# Patient Record
Sex: FEMALE | Race: BLACK OR AFRICAN AMERICAN | NOT HISPANIC OR LATINO | Employment: FULL TIME | URBAN - METROPOLITAN AREA
[De-identification: names, ages, dates, MRNs, and addresses within clinical notes are randomized per-mention and may not be internally consistent; named-entity substitution may affect disease eponyms.]

---
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
## 2019-02-13 ENCOUNTER — HISTORICAL (OUTPATIENT)
Dept: LAB | Facility: HOSPITAL | Age: 19
End: 2019-02-13

## 2019-02-13 LAB — T PALLIDUM AB SER QL: NORMAL

## 2020-02-13 LAB — POC BETA-HCG (QUAL): NEGATIVE

## 2020-02-17 LAB — POC BETA-HCG (QUAL): NEGATIVE

## 2022-04-10 ENCOUNTER — HISTORICAL (OUTPATIENT)
Dept: ADMINISTRATIVE | Facility: HOSPITAL | Age: 22
End: 2022-04-10

## 2022-04-11 ENCOUNTER — HISTORICAL (OUTPATIENT)
Dept: ADMINISTRATIVE | Facility: HOSPITAL | Age: 22
End: 2022-04-11

## 2022-04-26 VITALS
DIASTOLIC BLOOD PRESSURE: 70 MMHG | HEIGHT: 67 IN | BODY MASS INDEX: 26.64 KG/M2 | SYSTOLIC BLOOD PRESSURE: 120 MMHG | WEIGHT: 169.75 LBS

## 2022-09-19 ENCOUNTER — HISTORICAL (OUTPATIENT)
Dept: ADMINISTRATIVE | Facility: HOSPITAL | Age: 22
End: 2022-09-19

## 2023-04-10 ENCOUNTER — HOSPITAL ENCOUNTER (OUTPATIENT)
Dept: RADIOLOGY | Facility: HOSPITAL | Age: 23
Discharge: HOME | End: 2023-04-10
Attending: NURSE PRACTITIONER
Payer: MEDICAID

## 2023-04-10 DIAGNOSIS — R59.9 ENLARGED LYMPH NODES: Primary | ICD-10-CM

## 2023-04-10 DIAGNOSIS — R59.9 ENLARGED LYMPH NODES: ICD-10-CM

## 2023-04-10 PROCEDURE — 76536 US EXAM OF HEAD AND NECK: CPT | Mod: TC

## 2023-04-25 ENCOUNTER — ANESTHESIA EVENT (OUTPATIENT)
Dept: SURGERY | Facility: HOSPITAL | Age: 23
End: 2023-04-25
Payer: MEDICAID

## 2023-04-25 ENCOUNTER — HOSPITAL ENCOUNTER (OUTPATIENT)
Dept: PREADMISSION TESTING | Facility: HOSPITAL | Age: 23
Discharge: HOME | End: 2023-04-25
Payer: MEDICAID

## 2023-04-25 ENCOUNTER — HOSPITAL ENCOUNTER (OUTPATIENT)
Dept: RADIOLOGY | Facility: HOSPITAL | Age: 23
Discharge: HOME | End: 2023-04-25
Attending: SURGERY
Payer: MEDICAID

## 2023-04-25 VITALS — HEIGHT: 68 IN | BODY MASS INDEX: 28.46 KG/M2 | WEIGHT: 187.81 LBS

## 2023-04-25 DIAGNOSIS — Z01.818 PRE-OP TESTING: Primary | ICD-10-CM

## 2023-04-25 LAB
ABS NEUT CALC (OHS): 1.29 X10(3)/MCL (ref 2.1–9.2)
ALBUMIN SERPL-MCNC: 4.6 G/DL (ref 3.4–5)
ALBUMIN/GLOB SERPL: 1.6 RATIO
ALP SERPL-CCNC: 63 UNIT/L (ref 50–144)
ALT SERPL-CCNC: 29 UNIT/L (ref 1–45)
ANION GAP SERPL CALC-SCNC: 5 MEQ/L (ref 2–13)
ANISOCYTOSIS BLD QL SMEAR: SLIGHT
AST SERPL-CCNC: 31 UNIT/L (ref 14–36)
B-HCG SERPL QL: NEGATIVE
BILIRUBIN DIRECT+TOT PNL SERPL-MCNC: 0.4 MG/DL (ref 0–1)
BUN SERPL-MCNC: 10 MG/DL (ref 7–20)
CALCIUM SERPL-MCNC: 9.7 MG/DL (ref 8.4–10.2)
CHLORIDE SERPL-SCNC: 105 MMOL/L (ref 98–110)
CO2 SERPL-SCNC: 29 MMOL/L (ref 21–32)
CREAT SERPL-MCNC: 0.56 MG/DL (ref 0.66–1.25)
CREAT/UREA NIT SERPL: 18 (ref 12–20)
EOSINOPHIL NFR BLD MANUAL: 0.03 X10(3)/MCL (ref 0–0.9)
EOSINOPHIL NFR BLD MANUAL: 1 % (ref 0–8)
ERYTHROCYTE [DISTWIDTH] IN BLOOD BY AUTOMATED COUNT: 11.3 % (ref 11–14.5)
GFR SERPLBLD CREATININE-BSD FMLA CKD-EPI: >90 MLS/MIN/1.73/M2
GLOBULIN SER-MCNC: 2.9 GM/DL (ref 2–3.9)
GLUCOSE SERPL-MCNC: 80 MG/DL (ref 70–115)
HCT VFR BLD AUTO: 39 % (ref 36–48)
HGB BLD-MCNC: 13.1 G/DL (ref 11.8–16)
IMM GRANULOCYTES # BLD AUTO: 0 X10(3)/MCL (ref 0–0.03)
IMM GRANULOCYTES NFR BLD AUTO: 0 % (ref 0–0.5)
INR BLD: 0.97
LYMPHOCYTES NFR BLD MANUAL: 1.72 X10(3)/MCL
LYMPHOCYTES NFR BLD MANUAL: 52 % (ref 13–40)
MCH RBC QN AUTO: 31.6 PG (ref 27–34)
MCV RBC AUTO: 94 FL (ref 79–99)
MEAN CELL HEMOGLOBIN CONCENTRATION (OHS) G/DL: 33.6 G/DL (ref 31–37)
MICROCYTES BLD QL SMEAR: SLIGHT
MONOCYTES NFR BLD MANUAL: 0.26 X10(3)/MCL (ref 0.1–1.3)
MONOCYTES NFR BLD MANUAL: 8 % (ref 2–11)
NEUTROPHILS NFR BLD MANUAL: 37 % (ref 47–80)
NEUTS BAND NFR BLD MANUAL: 2 % (ref 0–11)
NRBC BLD AUTO-RTO: 0 % (ref 0–1)
PLATELET # BLD AUTO: 202 X10(3)/MCL (ref 140–371)
PLATELET # BLD EST: ADEQUATE 10*3/UL
PMV BLD AUTO: 10.6 FL (ref 9.4–12.4)
POTASSIUM SERPL-SCNC: 4.2 MMOL/L (ref 3.5–5.1)
PROT SERPL-MCNC: 7.5 GM/DL (ref 6.3–8.2)
PROTHROMBIN TIME: 9.9 SECONDS (ref 9.3–11.9)
RBC # BLD AUTO: 4.15 X10(6)/MCL (ref 4–5.1)
SODIUM SERPL-SCNC: 139 MMOL/L (ref 135–145)
WBC # SPEC AUTO: 3.3 X10(3)/MCL (ref 4–11.5)

## 2023-04-25 PROCEDURE — 85025 COMPLETE CBC W/AUTO DIFF WBC: CPT | Performed by: SURGERY

## 2023-04-25 PROCEDURE — 80053 COMPREHEN METABOLIC PANEL: CPT | Performed by: SURGERY

## 2023-04-25 PROCEDURE — 93010 ELECTROCARDIOGRAM REPORT: CPT | Mod: ,,, | Performed by: INTERNAL MEDICINE

## 2023-04-25 PROCEDURE — 93005 ELECTROCARDIOGRAM TRACING: CPT

## 2023-04-25 PROCEDURE — 81025 URINE PREGNANCY TEST: CPT

## 2023-04-25 PROCEDURE — 85027 COMPLETE CBC AUTOMATED: CPT | Performed by: SURGERY

## 2023-04-25 PROCEDURE — 93010 EKG 12-LEAD: ICD-10-PCS | Mod: ,,, | Performed by: INTERNAL MEDICINE

## 2023-04-25 PROCEDURE — 85610 PROTHROMBIN TIME: CPT | Performed by: SURGERY

## 2023-04-25 PROCEDURE — 71046 X-RAY EXAM CHEST 2 VIEWS: CPT | Mod: TC

## 2023-04-25 RX ORDER — ACETAMINOPHEN AND CODEINE PHOSPHATE 120; 12 MG/5ML; MG/5ML
1 SOLUTION ORAL DAILY
COMMUNITY
End: 2023-05-25 | Stop reason: SDUPTHER

## 2023-04-25 RX ORDER — DEXTROSE MONOHYDRATE AND SODIUM CHLORIDE 5; .9 G/100ML; G/100ML
INJECTION, SOLUTION INTRAVENOUS CONTINUOUS
Status: CANCELLED | OUTPATIENT
Start: 2023-04-26

## 2023-04-25 RX ORDER — CEFAZOLIN SODIUM IN 0.9 % NACL 2 G/100 ML
2 PLASTIC BAG, INJECTION (ML) INTRAVENOUS
Status: CANCELLED | OUTPATIENT
Start: 2023-04-26

## 2023-04-25 NOTE — ANESTHESIA PREPROCEDURE EVALUATION
04/25/2023  Joanna Prater is a 22 y.o., female.      Pre-op Assessment    I have reviewed the Patient Summary Reports.     I have reviewed the Nursing Notes. I have reviewed the NPO Status.   I have reviewed the Medications.     Review of Systems  Anesthesia Hx:  No problems with previous Anesthesia  Denies Family Hx of Anesthesia complications.   Denies Personal Hx of Anesthesia complications.   Social:  Smoker    Hematology/Oncology:  Hematology Normal   Oncology Normal     EENT/Dental:EENT/Dental Normal   Cardiovascular:   Exercise tolerance: good Valvular problems/Murmurs    Pulmonary:   Asthma mild  Asthma:    Renal/:  Renal/ Normal     Hepatic/GI:  Hepatic/GI Normal    Musculoskeletal:  Spine Disorders: lumbar Chronic Pain    Neurological:  Neurology Normal    Endocrine:  Endocrine Normal    Dermatological:  Skin Normal    Psych:   depression          Physical Exam  General: Well nourished, Cooperative, Alert and Oriented    Airway:  Mallampati: II / II  Mouth Opening: Normal  TM Distance: Normal  Tongue: Normal  Neck ROM: Normal ROM    Dental:  Intact        Anesthesia Plan  Type of Anesthesia, risks & benefits discussed:    Anesthesia Type: MAC  Intra-op Monitoring Plan: Standard ASA Monitors  Post Op Pain Control Plan: multimodal analgesia  Induction:  IV  Airway Plan: Direct  Informed Consent: Informed consent signed with the Patient and all parties understand the risks and agree with anesthesia plan.  All questions answered. Patient consented to blood products? Yes  ASA Score: 2  Day of Surgery Review of History & Physical: H&P Update referred to the surgeon/provider.I have interviewed and examined the patient. I have reviewed the patient's H&P dated: There are no significant changes.     Ready For Surgery From Anesthesia Perspective.     .

## 2023-04-25 NOTE — DISCHARGE INSTRUCTIONS

## 2023-04-26 ENCOUNTER — HOSPITAL ENCOUNTER (OUTPATIENT)
Facility: HOSPITAL | Age: 23
Discharge: HOME | End: 2023-04-26
Attending: SURGERY | Admitting: SURGERY
Payer: MEDICAID

## 2023-04-26 ENCOUNTER — ANESTHESIA (OUTPATIENT)
Dept: SURGERY | Facility: HOSPITAL | Age: 23
End: 2023-04-26
Payer: MEDICAID

## 2023-04-26 VITALS
TEMPERATURE: 98 F | DIASTOLIC BLOOD PRESSURE: 56 MMHG | RESPIRATION RATE: 18 BRPM | HEART RATE: 49 BPM | SYSTOLIC BLOOD PRESSURE: 113 MMHG | OXYGEN SATURATION: 100 %

## 2023-04-26 DIAGNOSIS — Z01.818 PRE-OP TESTING: ICD-10-CM

## 2023-04-26 DIAGNOSIS — R59.9 ENLARGED LYMPH NODE: Primary | ICD-10-CM

## 2023-04-26 LAB — B-HCG SERPL QL: NEGATIVE

## 2023-04-26 PROCEDURE — 71000016 HC POSTOP RECOV ADDL HR: Performed by: SURGERY

## 2023-04-26 PROCEDURE — 63600175 PHARM REV CODE 636 W HCPCS: Performed by: NURSE ANESTHETIST, CERTIFIED REGISTERED

## 2023-04-26 PROCEDURE — S5010 5% DEXTROSE AND 0.45% SALINE: HCPCS | Performed by: SURGERY

## 2023-04-26 PROCEDURE — 63600175 PHARM REV CODE 636 W HCPCS: Performed by: SURGERY

## 2023-04-26 PROCEDURE — D9220A PRA ANESTHESIA: Mod: ,,, | Performed by: NURSE ANESTHETIST, CERTIFIED REGISTERED

## 2023-04-26 PROCEDURE — 25000003 PHARM REV CODE 250: Performed by: NURSE ANESTHETIST, CERTIFIED REGISTERED

## 2023-04-26 PROCEDURE — 71000015 HC POSTOP RECOV 1ST HR: Performed by: SURGERY

## 2023-04-26 PROCEDURE — 37000009 HC ANESTHESIA EA ADD 15 MINS: Performed by: SURGERY

## 2023-04-26 PROCEDURE — D9220A PRA ANESTHESIA: ICD-10-PCS | Mod: ,,, | Performed by: NURSE ANESTHETIST, CERTIFIED REGISTERED

## 2023-04-26 PROCEDURE — 36000706: Performed by: SURGERY

## 2023-04-26 PROCEDURE — 25000003 PHARM REV CODE 250: Performed by: SURGERY

## 2023-04-26 PROCEDURE — 81025 URINE PREGNANCY TEST: CPT | Performed by: SURGERY

## 2023-04-26 PROCEDURE — 71000033 HC RECOVERY, INTIAL HOUR: Performed by: SURGERY

## 2023-04-26 PROCEDURE — 36000707: Performed by: SURGERY

## 2023-04-26 PROCEDURE — 00320 ANES ALL PX NECK NOS 1YR/>: CPT | Performed by: SURGERY

## 2023-04-26 PROCEDURE — 37000008 HC ANESTHESIA 1ST 15 MINUTES: Performed by: SURGERY

## 2023-04-26 RX ORDER — SODIUM CHLORIDE 9 MG/ML
INJECTION, SOLUTION INTRAVENOUS CONTINUOUS
Status: DISCONTINUED | OUTPATIENT
Start: 2023-04-26 | End: 2023-04-26 | Stop reason: HOSPADM

## 2023-04-26 RX ORDER — PROPOFOL 10 MG/ML
INJECTION, EMULSION INTRAVENOUS
Status: DISCONTINUED | OUTPATIENT
Start: 2023-04-26 | End: 2023-04-26

## 2023-04-26 RX ORDER — CEFAZOLIN SODIUM IN 0.9 % NACL 2 G/100 ML
2 PLASTIC BAG, INJECTION (ML) INTRAVENOUS
Status: DISCONTINUED | OUTPATIENT
Start: 2023-04-26 | End: 2023-04-26

## 2023-04-26 RX ORDER — FENTANYL CITRATE 50 UG/ML
INJECTION, SOLUTION INTRAMUSCULAR; INTRAVENOUS
Status: DISCONTINUED | OUTPATIENT
Start: 2023-04-26 | End: 2023-04-26

## 2023-04-26 RX ORDER — LIDOCAINE HYDROCHLORIDE 10 MG/ML
INJECTION INFILTRATION; PERINEURAL
Status: DISCONTINUED | OUTPATIENT
Start: 2023-04-26 | End: 2023-04-26 | Stop reason: HOSPADM

## 2023-04-26 RX ORDER — LIDOCAINE HYDROCHLORIDE 20 MG/ML
INJECTION INTRAVENOUS
Status: DISCONTINUED | OUTPATIENT
Start: 2023-04-26 | End: 2023-04-26

## 2023-04-26 RX ORDER — MIDAZOLAM HYDROCHLORIDE 1 MG/ML
2 INJECTION INTRAMUSCULAR; INTRAVENOUS
Status: COMPLETED | OUTPATIENT
Start: 2023-04-26 | End: 2023-04-26

## 2023-04-26 RX ORDER — BUPIVACAINE HYDROCHLORIDE 5 MG/ML
INJECTION, SOLUTION EPIDURAL; INTRACAUDAL
Status: DISCONTINUED | OUTPATIENT
Start: 2023-04-26 | End: 2023-04-26 | Stop reason: HOSPADM

## 2023-04-26 RX ORDER — DEXTROSE MONOHYDRATE AND SODIUM CHLORIDE 5; .45 G/100ML; G/100ML
INJECTION, SOLUTION INTRAVENOUS CONTINUOUS
Status: DISCONTINUED | OUTPATIENT
Start: 2023-04-26 | End: 2023-04-26 | Stop reason: HOSPADM

## 2023-04-26 RX ORDER — ONDANSETRON 2 MG/ML
INJECTION INTRAMUSCULAR; INTRAVENOUS
Status: DISCONTINUED | OUTPATIENT
Start: 2023-04-26 | End: 2023-04-26

## 2023-04-26 RX ORDER — FAMOTIDINE 20 MG/1
20 TABLET, FILM COATED ORAL
Status: COMPLETED | OUTPATIENT
Start: 2023-04-26 | End: 2023-04-26

## 2023-04-26 RX ORDER — DEXTROSE MONOHYDRATE AND SODIUM CHLORIDE 5; .9 G/100ML; G/100ML
INJECTION, SOLUTION INTRAVENOUS CONTINUOUS
Status: DISCONTINUED | OUTPATIENT
Start: 2023-04-26 | End: 2023-04-26

## 2023-04-26 RX ADMIN — CEFAZOLIN 2 G: 2 INJECTION, POWDER, FOR SOLUTION INTRAMUSCULAR; INTRAVENOUS at 01:04

## 2023-04-26 RX ADMIN — FENTANYL CITRATE 50 MCG: 50 INJECTION, SOLUTION INTRAMUSCULAR; INTRAVENOUS at 02:04

## 2023-04-26 RX ADMIN — LIDOCAINE HYDROCHLORIDE 50 MG: 20 INJECTION, SOLUTION INTRAVENOUS at 01:04

## 2023-04-26 RX ADMIN — FENTANYL CITRATE 50 MCG: 50 INJECTION, SOLUTION INTRAMUSCULAR; INTRAVENOUS at 01:04

## 2023-04-26 RX ADMIN — MIDAZOLAM HYDROCHLORIDE 2 MG: 1 INJECTION, SOLUTION INTRAMUSCULAR; INTRAVENOUS at 01:04

## 2023-04-26 RX ADMIN — ONDANSETRON 4 MG: 2 INJECTION INTRAMUSCULAR; INTRAVENOUS at 01:04

## 2023-04-26 RX ADMIN — GLYCOPYRROLATE 0.2 MG: 0.2 INJECTION, SOLUTION INTRAMUSCULAR; INTRAVITREAL at 11:04

## 2023-04-26 RX ADMIN — PROPOFOL 140 MG: 10 INJECTION, EMULSION INTRAVENOUS at 01:04

## 2023-04-26 RX ADMIN — DEXTROSE AND SODIUM CHLORIDE: 5; 450 INJECTION, SOLUTION INTRAVENOUS at 11:04

## 2023-04-26 RX ADMIN — FAMOTIDINE 20 MG: 20 TABLET, FILM COATED ORAL at 11:04

## 2023-04-26 NOTE — DISCHARGE SUMMARY
[facility]  Discharge Note  Short Stay    Procedure(s) (LRB):  EXCISION, NODULE (Left)      OUTCOME: Patient tolerated treatment/procedure well without complication and is now ready for discharge.    DISPOSITION: Home or Self Care    FINAL DIAGNOSIS:  Lymph node neck status post excisional biopsy    FOLLOWUP: In clinic upon week    DISCHARGE INSTRUCTIONS:  No discharge procedures on file.     TIME SPENT ON DISCHARGE:  5 minutes

## 2023-04-26 NOTE — PLAN OF CARE
Received from PACU per stretcher in good condition, awake and alert.  Report from AD Ku RN. Iced water brought, does not want any right now. Dressing dry and intact.

## 2023-04-26 NOTE — ANESTHESIA POSTPROCEDURE EVALUATION
Anesthesia Post Evaluation    Patient: Joanna Prater    Procedure(s) Performed: Procedure(s) (LRB):  EXCISION, NODULE (Left)    Final Anesthesia Type: general      Patient location during evaluation: PACU  Patient participation: Yes- Able to Participate  Level of consciousness: awake and alert, awake and oriented  Post-procedure vital signs: reviewed and stable  Pain management: adequate  Airway patency: patent    PONV status at discharge: No PONV  Anesthetic complications: no      Cardiovascular status: blood pressure returned to baseline  Respiratory status: unassisted, room air and spontaneous ventilation  Hydration status: euvolemic  Follow-up not needed.          Vitals Value Taken Time   /59 04/26/23 1131   Temp 36.3 °C (97.4 °F) 04/26/23 1131   Pulse 58 04/26/23 1131   Resp 18 04/26/23 1131   SpO2 100 % 04/26/23 1131         No case tracking events are documented in the log.      Pain/Milan Score: No data recorded

## 2023-04-26 NOTE — OP NOTE
[facility]  Operative Note      Date of Procedure: 4/26/2023     Procedure: Procedure(s) (LRB):  EXCISION, NODULE (Left)     Surgeon(s) and Role:     * Fidencio Rosenberg MD - Primary    Assisting Surgeon: None    Pre-Operative Diagnosis:  Enlarged left upper posterior neck lymph node    Post-Operative Diagnosis: Post-Op Diagnosis Codes:     * Enlarged lymph node [R59.9]    Anesthesia: General    Operative Findings (including complications, if any):  Patient is a 22-year-old  female persistent lymph node in the left neck that was treated with antibiotics unresolved and needed excision.  Patient had an ultrasound on 04/10/2023 that was consistent with a 1.5 cm node on the left neck.    Patient was brought to the operating room supine position underwent local and IV sedation had an incision made over the lymph node it was on the posterior aspect of the sternocleidomastoid muscle about midbody near the spinal accessory nerve.  Patient underwent excision of the lymph node with a 15 blade scalpel tenotomy scissors and Bovie cautery.  The spinal accessory nerve was identified and avoided.  Patient had good hemostasis Bovie cautery.  Deep layer was closed with 3-0 Vicryl.  The skin was closed with 4-0 plain gut suture.  Sterile dressings were applied no problems encountered patient tolerated procedure well          Description of Technical Procedures:  Noted above successful excisional biopsy      Significant Surgical Tasks Conducted by the Assistant(s), if Applicable:  None       Estimated Blood Loss (EBL): * No values recorded between 4/26/2023  2:08 PM and 4/26/2023  2:25 PM *           Implants: * No implants in log *    Specimens:   Specimen (24h ago, onward)       Start     Ordered    04/26/23 1416  Specimen to Pathology  RELEASE UPON ORDERING        Comments: Specimen B: Lymph node protocol, lymphoma protocol     References:    Click here for ordering Quick Tip    Question:  Release to patient  Answer:  Immediate    04/26/23 1419                            Condition: Good    Disposition: PACU - hemodynamically stable.    Attestation: I was present and scrubbed for the entire procedure.    Discharge Note    OUTCOME: Patient tolerated treatment/procedure well without complication and is now ready for discharge.    DISPOSITION: Home or Self Care    FINAL DIAGNOSIS:  Lymph node neck failing medical treatment    FOLLOWUP: In clinic 1 week    DISCHARGE INSTRUCTIONS:  No discharge procedures on file.

## 2023-04-26 NOTE — PLAN OF CARE
Re-enforced dressing with tape. Discharge instructions reviewed with patient/family, copy given.  Discharged per ambulatory to car accompanied by family.

## 2023-04-26 NOTE — DISCHARGE INSTRUCTIONS
DECREASED ACTIVITY TODAY, NO HEAVY LIFTING OR STRAINING, NO PUSHING OR PULLING, DECREASED ACTIVITY UNTIL FOLLOW UP APPOINTMENT.    NO DRIVING TODAY OR WHILE ON PAIN MEDS,     SHOWERS ONLY, TO TUB BATHS,    CALL YOUR DOCTOR IF YOU HAVE ANY OF THE FOLLOWING:   ELEVATED TEMP  OR GREATER,   INCREASED SWELLING,   FOUL DRAINAGE FROM WOUND/INCISION,   PAIN UNRELIEVED BY MEDICATION,   NAUSEA AND /OR VOMITING, WEAKNESS,   EXCESSIVE BRIGHT RED BLEEDING FROM SITE,   DIFFICULTY BREATHING OR SWALLOWING

## 2023-04-26 NOTE — ANESTHESIA PROCEDURE NOTES
Intubation    Date/Time: 4/26/2023 1:43 PM  Performed by: Chandana Donovan CRNA  Authorized by: Chandana Donovan CRNA     Intubation:     Induction:  Intravenous    Intubated:  Postinduction    Mask Ventilation:  Easy mask    Attempts:  1    Attempted By:  CRNA    Method of Intubation:  Direct    Difficult Airway Encountered?: No      Complications:  None    Airway Device:  Supraglottic airway/LMA    Airway Device Size:  2.5    Style/Cuff Inflation:  Cuffed    Secured at:  The lips    Placement Verified By:  Capnometry    Complicating Factors:  None    Findings Post-Intubation:  BS equal bilateral and atraumatic/condition of teeth unchanged

## 2023-05-06 ENCOUNTER — HOSPITAL ENCOUNTER (EMERGENCY)
Facility: HOSPITAL | Age: 23
Discharge: HOME | End: 2023-05-06
Attending: EMERGENCY MEDICINE
Payer: MEDICAID

## 2023-05-06 VITALS
OXYGEN SATURATION: 98 % | TEMPERATURE: 98 F | DIASTOLIC BLOOD PRESSURE: 62 MMHG | RESPIRATION RATE: 18 BRPM | HEART RATE: 72 BPM | HEIGHT: 68 IN | BODY MASS INDEX: 28.85 KG/M2 | WEIGHT: 190.38 LBS | SYSTOLIC BLOOD PRESSURE: 105 MMHG

## 2023-05-06 DIAGNOSIS — M54.50 ACUTE RIGHT-SIDED LOW BACK PAIN WITHOUT SCIATICA: Primary | ICD-10-CM

## 2023-05-06 PROCEDURE — 99284 EMERGENCY DEPT VISIT MOD MDM: CPT

## 2023-05-06 PROCEDURE — 63600175 PHARM REV CODE 636 W HCPCS: Performed by: EMERGENCY MEDICINE

## 2023-05-06 PROCEDURE — 96372 THER/PROPH/DIAG INJ SC/IM: CPT | Performed by: EMERGENCY MEDICINE

## 2023-05-06 RX ORDER — METHOCARBAMOL 750 MG/1
750 TABLET, FILM COATED ORAL 4 TIMES DAILY
Qty: 20 TABLET | Refills: 0 | Status: SHIPPED | OUTPATIENT
Start: 2023-05-06 | End: 2023-05-16

## 2023-05-06 RX ORDER — TRAMADOL HYDROCHLORIDE 50 MG/1
50 TABLET ORAL EVERY 6 HOURS PRN
Qty: 4 TABLET | Refills: 0 | Status: SHIPPED | OUTPATIENT
Start: 2023-05-06 | End: 2023-07-24

## 2023-05-06 RX ORDER — IBUPROFEN 600 MG/1
600 TABLET ORAL EVERY 6 HOURS PRN
Qty: 20 TABLET | Refills: 0 | Status: SHIPPED | OUTPATIENT
Start: 2023-05-06 | End: 2023-07-24

## 2023-05-06 RX ORDER — KETOROLAC TROMETHAMINE 30 MG/ML
60 INJECTION, SOLUTION INTRAMUSCULAR; INTRAVENOUS
Status: COMPLETED | OUTPATIENT
Start: 2023-05-06 | End: 2023-05-06

## 2023-05-06 RX ADMIN — KETOROLAC TROMETHAMINE 60 MG: 60 INJECTION, SOLUTION INTRAMUSCULAR at 07:05

## 2023-05-06 NOTE — DISCHARGE INSTRUCTIONS
Gentle stretching hot and cold compresses alternating may all help his back issue    You need to follow-up with your regular doctor if you are having recurrent back problems    The Ultram may make you drowsy please be careful    Do not mix other anti-inflammatory drugs with the prescription ibuprofen    You may add acetaminophen to these medications if needed

## 2023-05-06 NOTE — Clinical Note
"Joanna Palomaresse Prater was seen and treated in our emergency department on 5/6/2023.  She may return to work on 05/07/2023.       If you have any questions or concerns, please don't hesitate to call.      Agustín Dominique MD"

## 2023-05-06 NOTE — ED PROVIDER NOTES
Encounter Date: 2023       History     Chief Complaint   Patient presents with    Muscle Pain     C/o soreness to lower back after bending down to get her niece from stroller yesterday     22-year-old female presents emergency department stating that yesterday morning she went to lift up her nieces Walker and she felt a sharp pain in her right back about 8:00 a.m. yesterday morning she took a Norco for the pain when it was still hurting this morning she came to the emergency department.  She says it sent her right lower back she indicates the paraspinous muscle area above the SI joint.  She denies any urinary symptoms she denies fever chills nausea vomiting she denies incontinence of bowel or bladder she denies constipation or trouble moving bowels.  She denies any radiation of the pain.  She denies any pain going down her leg.  She tells me that she has a history of back issues.  Her back hurts a lot.      Past medical history back issues.  No daily medications  Social history single lady lives with her mother she does not smoke she drinks alcohol occasionally she does use marijuana.      Vaccinations no flu no tetanus no COVID no pneumonia vaccines.    Surgical history lymph node excision last month wisdom teeth extraction tonsillectomy adenoidectomy.    Primary healthcare provider Evon Christianson.  Menstrual history currently on her normal cycle does not think she is pregnant.   0 para 0.  Had a cycle last month same timeframe.      She is employed it states mother is alive with glaucoma and hypertension father is alive he has chronic back issues stomach issues and hypertension.    Review of patient's allergies indicates:   Allergen Reactions    Adhesive Hives     Past Medical History:   Diagnosis Date    Mild intermittent asthma, uncomplicated      Past Surgical History:   Procedure Laterality Date    EXCISION OF NODULE Left 2023    Procedure: EXCISION, NODULE;  Surgeon: Fidencio Rosenberg MD;   Location: [facility] OR;  Service: General;  Laterality: Left;  NECK    TONSILLECTOMY      WISDOM TOOTH EXTRACTION       Family History   Problem Relation Age of Onset    Glaucoma Mother     Chronic back pain Father      Social History     Tobacco Use    Smoking status: Never     Passive exposure: Past    Smokeless tobacco: Never   Substance Use Topics    Alcohol use: Yes     Comment: SOMETIMES    Drug use: Yes     Types: Marijuana     Comment: POT     Review of Systems   Constitutional: Negative.    HENT: Negative.     Eyes: Negative.    Respiratory: Negative.     Cardiovascular: Negative.    Gastrointestinal: Negative.  Negative for abdominal pain, diarrhea, nausea and vomiting.   Endocrine: Negative.    Genitourinary:  Negative for difficulty urinating, dysuria and pelvic pain.   Musculoskeletal:  Positive for back pain. Negative for gait problem, joint swelling, neck pain and neck stiffness.   Allergic/Immunologic: Negative.    Neurological:  Negative for tremors, facial asymmetry and weakness.   Hematological: Negative.    Psychiatric/Behavioral: Negative.     All other systems reviewed and are negative.    Physical Exam     Initial Vitals [05/06/23 0658]   BP Pulse Resp Temp SpO2   105/62 72 18 98.1 °F (36.7 °C) 98 %      MAP       --         Physical Exam    Nursing note and vitals reviewed.  Constitutional: She appears well-developed and well-nourished.   Well-developed well-nourished sits up from the laying on the bed with no issue.   HENT:   Head: Normocephalic and atraumatic.   Right Ear: Tympanic membrane and external ear normal.   Left Ear: Tympanic membrane and external ear normal.   Nose: Nose normal.   Mouth/Throat: Oropharynx is clear and moist and mucous membranes are normal.   Eyes: EOM are normal. Pupils are equal, round, and reactive to light.   Neck: Neck supple. No thyromegaly present. No tracheal deviation present. No JVD present.   Normal range of motion.  Cardiovascular:  Normal rate, regular  rhythm and normal heart sounds.     Exam reveals no gallop and no friction rub.       No murmur heard.  Pulmonary/Chest: Breath sounds normal. No stridor. No respiratory distress. She has no wheezes. She has no rhonchi. She has no rales. She exhibits no tenderness.   Abdominal: Abdomen is soft. Bowel sounds are normal. She exhibits no distension and no mass. There is no abdominal tenderness.   Genitourinary:    Genitourinary Comments: There is no CVA tenderness     Musculoskeletal:         General: No tenderness or edema. Normal range of motion.      Cervical back: Normal range of motion and neck supple.      Comments: Patient does not have any discernible muscle spasm in the right lower back that I can palpate she has no step-off in the midline cervical thoracic lumbar sacral spine she has negative straight leg raise bilaterally patellar reflexes are 2+ and symmetrical.  Distal neurovascular exam is intact in both lower extremities anterior abdomen is completely benign     Lymphadenopathy:     She has no cervical adenopathy.   Neurological: She is alert and oriented to person, place, and time. She has normal strength and normal reflexes. She displays normal reflexes. No cranial nerve deficit or sensory deficit. GCS score is 15. GCS eye subscore is 4. GCS verbal subscore is 5. GCS motor subscore is 6.   Skin: Skin is warm and dry. Capillary refill takes 2 to 3 seconds. No rash noted.   Psychiatric: She has a normal mood and affect. Her behavior is normal. Judgment and thought content normal.       ED Course   Procedures  Labs Reviewed - No data to display       Imaging Results    None          Medications   ketorolac injection 60 mg (60 mg Intramuscular Given 5/6/23 0718)     Medical Decision Making:   Initial Assessment:   Back pain after lifting up niece his walker yesterday.  Complains of pain right lower back apparently happened yesterday morning.  24 hours ago proximally    Normocephalic atraumatic  Heart is  regular rate and rhythm lungs are clear to auscultation and percussion abdomen is quite benign to exam back no discernible muscle spasm good bowel sounds all quadrants no midline tenderness cervical thoracic lumbar sacral spine negative straight leg raise negative bent leg raise intact deep tendon reflexes neurological is appropriate psychological is appropriate  Differential Diagnosis:   Muscle strain, muscle spasm, possible facet joint popping.  Acute herniated nucleus p HNP.  Spinal stenosis.  ED Management:  Patient is offered Toradol here patient is given prescriptions for non anti-inflammatory drugs tramadol 1 day course an and Robaxin for muscle relaxant advised to follow up with her doctor she has no symptoms that warrant any radiographs she has no symptoms that warrant any laboratory evaluationShe is on her menstrual cycle and said she is not pregnant.  MDM  Problems addressed  Co-morbidities and/or factors adding to the complexity or risk for the patient:  Issue lots of back pain she says often her back hurts   Problems addressed:  Acute back pain from yesterday  Acute problem/illness or progression/exacerbation of chronic problem with potential threat to life/bodily dysfunction?:  None known  Differential diagnoses/problems considered: see above     Amount and/or Complexity of Data Reviewed  Independent Historian: none (see above for summary)  External Data Reviewed: notes from previous admissions and notes from previous ED visits (see above for summary)  Risk and benefits of testing: discussed   Labs: Labs: ordered and reviewed  Radiology:Radiology: ordered and independent interpretation performed (see above or ED course)  ECG/medicine tests:Radiology: ordered and independent interpretation performed (see above or ED course)  none    Risk  Prescription drug management   Parenteral controlled substances   Shared decision making     Critical Care  none                         Clinical Impression:   Final  diagnoses:  [M54.50] Acute right-sided low back pain without sciatica (Primary)        ED Disposition Condition    Discharge Stable          ED Prescriptions       Medication Sig Dispense Start Date End Date Auth. Provider    traMADoL (ULTRAM) 50 mg tablet Take 1 tablet (50 mg total) by mouth every 6 (six) hours as needed for Pain. 4 tablet 5/6/2023 -- Agustín Dominique MD    methocarbamoL (ROBAXIN) 750 MG Tab Take 1 tablet (750 mg total) by mouth 4 (four) times daily. for 10 days 20 tablet 5/6/2023 5/16/2023 Agustín Dominique MD    ibuprofen (ADVIL,MOTRIN) 600 MG tablet Take 1 tablet (600 mg total) by mouth every 6 (six) hours as needed for Pain (take with meal). 20 tablet 5/6/2023 -- Agustín Dominique MD          Follow-up Information       Follow up With Specialties Details Why Contact Info    Evon Christianson NP Family Medicine In 2 days  [address] [facility] [address]  [phone]               Agustín Dominique MD  05/06/23 0737

## 2023-05-24 NOTE — PROGRESS NOTES
Chief Complaint: Annual exam        HPI:   Joanna Prater is a 22 y.o. year old  here for her Annual Exam.  Need refill of micronor.  No c/o.      GYN History:   LMP: 05/03/2023  Frequency: 28   Cycle Length: 5-7 days   Flow: normal  Intermenstrual Bleeding: No  Postcoital Bleeding: No  Dysmenorrhea: No  Sexually Active: yes   Dyspareunia: No  Contraception: OCP- Micronor   H/o STI: No   Last pap: 4/28/2022- wnl  H/o Abnormal Pap: Yes- 2/19/21 LGSIL  Colposcopy: no  Gardasil: 3/3   MMG: n/a  H/o abnl MMG: n/a          Past Medical History:   Diagnosis Date    Mild intermittent asthma, uncomplicated      Past Surgical History:   Procedure Laterality Date    EXCISION OF NODULE Left 4/26/2023    Procedure: EXCISION, NODULE;  Surgeon: Fidencio Rosenberg MD;  Location: [facility] OR;  Service: General;  Laterality: Left;  NECK    TONSILLECTOMY      WISDOM TOOTH EXTRACTION         Current Outpatient Medications:     ibuprofen (ADVIL,MOTRIN) 600 MG tablet, Take 1 tablet (600 mg total) by mouth every 6 (six) hours as needed for Pain (take with meal)., Disp: 20 tablet, Rfl: 0    norethindrone (MICRONOR) 0.35 mg tablet, Take 1 tablet by mouth once daily., Disp: , Rfl:     traMADoL (ULTRAM) 50 mg tablet, Take 1 tablet (50 mg total) by mouth every 6 (six) hours as needed for Pain., Disp: 4 tablet, Rfl: 0  Review of patient's allergies indicates:   Allergen Reactions    Adhesive Hives     OB History   No obstetric history on file.     Social History     Tobacco Use    Smoking status: Never     Passive exposure: Past    Smokeless tobacco: Never   Substance Use Topics    Alcohol use: Yes     Comment: SOMETIMES    Drug use: Yes     Types: Marijuana     Comment: POT     Family History   Problem Relation Age of Onset    Glaucoma Mother     Chronic back pain Father        Review of Systems:   Review of Systems   Constitutional:  Negative for appetite change, chills, fatigue, fever and unexpected weight change.   Eyes:  Negative for  "visual disturbance.   Respiratory:  Negative for cough, shortness of breath and wheezing.    Cardiovascular:  Negative for chest pain, palpitations and leg swelling.   Gastrointestinal:  Negative for abdominal pain, bloating, blood in stool, constipation, diarrhea, nausea, vomiting, reflux and fecal incontinence.   Endocrine: Negative for hair loss and hot flashes.   Genitourinary:  Negative for bladder incontinence, decreased libido, dysmenorrhea, dyspareunia, dysuria, flank pain, frequency, genital sores, hematuria, hot flashes, menorrhagia, menstrual problem, pelvic pain, urgency, vaginal bleeding, vaginal discharge, vaginal pain, urinary incontinence, postcoital bleeding, postmenopausal bleeding, vaginal dryness and vaginal odor.   Integumentary:  Negative for rash, acne, hair changes, breast mass, nipple discharge, breast skin changes and breast tenderness.   Neurological:  Negative for headaches.   Psychiatric/Behavioral:  Negative for depression.    Breast: Negative for asymmetry, breast self exam, lump, mass, mastodynia, nipple discharge, skin changes and tenderness     Physical Exam:  /60 (BP Location: Right arm)   Ht 5' 8" (1.727 m)   Wt 84.9 kg (187 lb 3.2 oz)   LMP 05/03/2023 (Exact Date)   BMI 28.46 kg/m²       Physical Exam:   Constitutional: She is oriented to person, place, and time. She appears well-developed and well-nourished.    HENT:   Head: Normocephalic.      Cardiovascular:       Exam reveals no edema.        Pulmonary/Chest: Effort normal. She exhibits no mass, no tenderness, no bony tenderness, no deformity and no retraction. Right breast exhibits no inverted nipple, no mass, no nipple discharge, no skin change, no tenderness, no bleeding, no swelling, no mastectomy, no augmentation and no lumpectomy. Left breast exhibits no inverted nipple, no mass, no nipple discharge, no skin change, no tenderness, no bleeding, no swelling, no mastectomy, no augmentation and no lumpectomy. " Breasts are symmetrical.        Abdominal: Soft. She exhibits no distension and no mass. There is no abdominal tenderness. There is no rebound and no guarding. No hernia. Hernia confirmed negative in the right inguinal area.     Genitourinary:    Inguinal canal, vagina, uterus, right adnexa, left adnexa and rectum normal.   Rectum:      No anal fissure or external hemorrhoid.   The external female genitalia was normal.   No external genitalia lesions identified,Genitalia hair distrobution normal .   Labial bartholins normal.There is no rash, tenderness, lesion or injury on the right labia. There is no rash, tenderness, lesion or injury on the left labia. Cervix is normal. No no masses or organomegaly. Right adnexum displays no mass, no tenderness and no fullness. Left adnexum displays no mass, no tenderness and no fullness. Vagina exhibits no lesion. No erythema,  no vaginal discharge, tenderness, bleeding, rectocele, cystocele or unspecified prolapse of vaginal walls in the vagina.    No foreign body in the vagina.      No signs of injury in the vagina.   Vagina was moist.Cervix exhibits no motion tenderness, no lesion, no discharge, no friability, no lesion, no tenderness and no polyp. Uterus consistancy normal. and Uerus contour normal  Uterus is not deviated, not enlarged, not fixed, not tender, not hosting fibroids and no uterine prolapse. Normal urethral meatus.Urethral Meatus exhibits: urethral lesion and prolapsedUrethra findings: no urethral mass and no tendernessBladder findings: no bladder tenderness          Musculoskeletal: Normal range of motion.      Lymphadenopathy: No inguinal adenopathy noted on the right or left side.    Neurological: She is alert and oriented to person, place, and time.    Skin: Skin is warm and dry.    Psychiatric: She has a normal mood and affect. Her behavior is normal. Judgment and thought content normal.      Assessment:   Annual Well Women Exam  1. Encounter for  gynecological examination (general) (routine) with abnormal findings  - Liquid-Based Pap Smear, Screening Screening    2. Encounter for contraceptive management, unspecified type    3. Screening for malignant neoplasm of the cervix  - Liquid-Based Pap Smear, Screening Screening        Plan:  Pap Done  GC CZ TV   Breast Self-awareness  Recommend exercise at least 3 times weekly  Healthy, balanced diet  Keep yearly follow up with PCP  Follow up for PRN/Annual .       Refill Micronor     Counseling:    A brief discussion of contraceptive choices and STD prevention was had.    Avoidance of cigarette smoking, alcohol use, and drug use was encouraged.    A healthy diet and regular exercise was stressed.    All questions were answered and the patient voiced understanding of the above issues.

## 2023-05-25 ENCOUNTER — OFFICE VISIT (OUTPATIENT)
Dept: OBSTETRICS AND GYNECOLOGY | Facility: CLINIC | Age: 23
End: 2023-05-25
Payer: MEDICAID

## 2023-05-25 VITALS
SYSTOLIC BLOOD PRESSURE: 110 MMHG | BODY MASS INDEX: 28.37 KG/M2 | DIASTOLIC BLOOD PRESSURE: 60 MMHG | HEIGHT: 68 IN | WEIGHT: 187.19 LBS

## 2023-05-25 DIAGNOSIS — Z30.9 ENCOUNTER FOR CONTRACEPTIVE MANAGEMENT, UNSPECIFIED TYPE: ICD-10-CM

## 2023-05-25 DIAGNOSIS — Z01.411 ENCOUNTER FOR GYNECOLOGICAL EXAMINATION (GENERAL) (ROUTINE) WITH ABNORMAL FINDINGS: Primary | ICD-10-CM

## 2023-05-25 DIAGNOSIS — Z12.4 SCREENING FOR MALIGNANT NEOPLASM OF THE CERVIX: ICD-10-CM

## 2023-05-25 PROCEDURE — 3008F BODY MASS INDEX DOCD: CPT | Mod: CPTII,,, | Performed by: OBSTETRICS & GYNECOLOGY

## 2023-05-25 PROCEDURE — 1159F MED LIST DOCD IN RCRD: CPT | Mod: CPTII,,, | Performed by: OBSTETRICS & GYNECOLOGY

## 2023-05-25 PROCEDURE — 3074F PR MOST RECENT SYSTOLIC BLOOD PRESSURE < 130 MM HG: ICD-10-PCS | Mod: CPTII,,, | Performed by: OBSTETRICS & GYNECOLOGY

## 2023-05-25 PROCEDURE — 99395 PR PREVENTIVE VISIT,EST,18-39: ICD-10-PCS | Mod: ,,, | Performed by: OBSTETRICS & GYNECOLOGY

## 2023-05-25 PROCEDURE — 99395 PREV VISIT EST AGE 18-39: CPT | Mod: ,,, | Performed by: OBSTETRICS & GYNECOLOGY

## 2023-05-25 PROCEDURE — 3078F DIAST BP <80 MM HG: CPT | Mod: CPTII,,, | Performed by: OBSTETRICS & GYNECOLOGY

## 2023-05-25 PROCEDURE — 3074F SYST BP LT 130 MM HG: CPT | Mod: CPTII,,, | Performed by: OBSTETRICS & GYNECOLOGY

## 2023-05-25 PROCEDURE — 1159F PR MEDICATION LIST DOCUMENTED IN MEDICAL RECORD: ICD-10-PCS | Mod: CPTII,,, | Performed by: OBSTETRICS & GYNECOLOGY

## 2023-05-25 PROCEDURE — 3078F PR MOST RECENT DIASTOLIC BLOOD PRESSURE < 80 MM HG: ICD-10-PCS | Mod: CPTII,,, | Performed by: OBSTETRICS & GYNECOLOGY

## 2023-05-25 PROCEDURE — 3008F PR BODY MASS INDEX (BMI) DOCUMENTED: ICD-10-PCS | Mod: CPTII,,, | Performed by: OBSTETRICS & GYNECOLOGY

## 2023-05-25 RX ORDER — ACETAMINOPHEN AND CODEINE PHOSPHATE 120; 12 MG/5ML; MG/5ML
1 SOLUTION ORAL DAILY
Qty: 30 TABLET | Refills: 11 | Status: SHIPPED | OUTPATIENT
Start: 2023-05-25 | End: 2024-03-28 | Stop reason: SDUPTHER

## 2023-05-30 LAB — PSYCHE PATHOLOGY RESULT: NORMAL

## 2023-05-31 DIAGNOSIS — M32.9 LUPUS: Primary | ICD-10-CM

## 2023-07-24 ENCOUNTER — OFFICE VISIT (OUTPATIENT)
Dept: RHEUMATOLOGY | Facility: CLINIC | Age: 23
End: 2023-07-24
Payer: MEDICAID

## 2023-07-24 VITALS
HEART RATE: 85 BPM | WEIGHT: 185.63 LBS | TEMPERATURE: 98 F | BODY MASS INDEX: 29.13 KG/M2 | RESPIRATION RATE: 20 BRPM | OXYGEN SATURATION: 99 % | HEIGHT: 67 IN | SYSTOLIC BLOOD PRESSURE: 101 MMHG | DIASTOLIC BLOOD PRESSURE: 70 MMHG

## 2023-07-24 DIAGNOSIS — M25.612 SHOULDER STIFFNESS, LEFT: ICD-10-CM

## 2023-07-24 DIAGNOSIS — D70.9 NEUTROPENIA, UNSPECIFIED TYPE: ICD-10-CM

## 2023-07-24 DIAGNOSIS — R01.1 HEART MURMUR: ICD-10-CM

## 2023-07-24 DIAGNOSIS — R59.0 LYMPHADENOPATHY, CERVICAL: Primary | ICD-10-CM

## 2023-07-24 DIAGNOSIS — I88.1 KIKUCHI DISEASE: ICD-10-CM

## 2023-07-24 PROCEDURE — 3078F DIAST BP <80 MM HG: CPT | Mod: CPTII,,, | Performed by: INTERNAL MEDICINE

## 2023-07-24 PROCEDURE — 3008F BODY MASS INDEX DOCD: CPT | Mod: CPTII,,, | Performed by: INTERNAL MEDICINE

## 2023-07-24 PROCEDURE — 99205 PR OFFICE/OUTPT VISIT, NEW, LEVL V, 60-74 MIN: ICD-10-PCS | Mod: S$PBB,,, | Performed by: INTERNAL MEDICINE

## 2023-07-24 PROCEDURE — 99214 OFFICE O/P EST MOD 30 MIN: CPT | Mod: PBBFAC | Performed by: INTERNAL MEDICINE

## 2023-07-24 PROCEDURE — 1159F PR MEDICATION LIST DOCUMENTED IN MEDICAL RECORD: ICD-10-PCS | Mod: CPTII,,, | Performed by: INTERNAL MEDICINE

## 2023-07-24 PROCEDURE — 3008F PR BODY MASS INDEX (BMI) DOCUMENTED: ICD-10-PCS | Mod: CPTII,,, | Performed by: INTERNAL MEDICINE

## 2023-07-24 PROCEDURE — 99205 OFFICE O/P NEW HI 60 MIN: CPT | Mod: S$PBB,,, | Performed by: INTERNAL MEDICINE

## 2023-07-24 PROCEDURE — 1159F MED LIST DOCD IN RCRD: CPT | Mod: CPTII,,, | Performed by: INTERNAL MEDICINE

## 2023-07-24 PROCEDURE — 3078F PR MOST RECENT DIASTOLIC BLOOD PRESSURE < 80 MM HG: ICD-10-PCS | Mod: CPTII,,, | Performed by: INTERNAL MEDICINE

## 2023-07-24 PROCEDURE — 3074F SYST BP LT 130 MM HG: CPT | Mod: CPTII,,, | Performed by: INTERNAL MEDICINE

## 2023-07-24 PROCEDURE — 3074F PR MOST RECENT SYSTOLIC BLOOD PRESSURE < 130 MM HG: ICD-10-PCS | Mod: CPTII,,, | Performed by: INTERNAL MEDICINE

## 2023-07-24 NOTE — PROGRESS NOTES
Patient ID: [id number]     Chief Complaint: possible lupus (Pt states she has a lot of pain to the left shoulder/ neck area states is stiffness to area. )      Referred By: Evon Christianson     HPI:     Joanna Prater is a 22 y.o. female here today for a new patient visit.      She had noticed swollen nodule on left side of neck in 3/2023, s/p resection in 4/2023, which showed inflammation, she is here for further evaluation to r/o underlying connective tissue disease. Denies any h/o infection before that. Denies h/o frequent infections.   No episodes like that in the past, currently does not have swollen glands.   No fevers or rashes or joint pain. Denies red, warm and swollen joints. Morning stiffness in neck and shoulder sometimes for less than 20 min. Sometimes shoulder pain she mary jane hair, she stretches and it feels better.   She works in home health care.     Denies history of fevers, rashes, photosensitivity, oral or nasal ulcers, h/o MI, stroke, seizures, h/o PE or DVT, Raynaud's phenomenon, uveitis, malignancies.   Family history of autoimmune disease: none.  Pregnancies:  none Miscarriages: none  Smoking: smokes Marijuana       Social History     Tobacco Use   Smoking Status Never    Passive exposure: Past   Smokeless Tobacco Never   Tobacco Comments    Pt denies smoking cigarettes.           ----------------------------  History of migraine  Mild intermittent asthma, uncomplicated     Past Surgical History:   Procedure Laterality Date    EXCISION OF NODULE Left 4/26/2023    Procedure: EXCISION, NODULE;  Surgeon: Fidencio Rosenberg MD;  Location: [facility];  Service: General;  Laterality: Left;  NECK    TONSILLECTOMY      WISDOM TOOTH EXTRACTION         Review of patient's allergies indicates:   Allergen Reactions    Adhesive Hives       Outpatient Medications Marked as Taking for the 7/24/23 encounter (Office Visit) with Allison Grande MD   Medication Sig Dispense Refill    norethindrone  (MICRONOR) 0.35 mg tablet Take 1 tablet (0.35 mg total) by mouth once daily. 30 tablet 11    [DISCONTINUED] ibuprofen (ADVIL,MOTRIN) 600 MG tablet Take 1 tablet (600 mg total) by mouth every 6 (six) hours as needed for Pain (take with meal). 20 tablet 0    [DISCONTINUED] traMADoL (ULTRAM) 50 mg tablet Take 1 tablet (50 mg total) by mouth every 6 (six) hours as needed for Pain. 4 tablet 0       Social History     Socioeconomic History    Marital status: Single   Tobacco Use    Smoking status: Never     Passive exposure: Past    Smokeless tobacco: Never    Tobacco comments:     Pt denies smoking cigarettes.    Substance and Sexual Activity    Alcohol use: Yes     Comment: SOMETIMES    Drug use: Yes     Types: Marijuana    Sexual activity: Yes     Partners: Male        Family History   Problem Relation Age of Onset    Glaucoma Mother     Chronic back pain Father           There is no immunization history on file for this patient.    Patient Care Team:  Evon Christianson NP as PCP - General (Family Medicine)     Subjective:     Constitutional:  Denies chills. Denies fever. Denies night sweats. Denies weight loss.   Ophthalmology: Denies blurred vision. Denies dry eyes. Denies eye pain. Denies Itching and redness.   ENT: Denies oral ulcers. Denies epistaxis. Denies dry mouth. Denies swollen glands.   Endocrine: Denies diabetes. Denies thyroid Problems.   Respiratory: Denies cough. Denies shortness of breath. Denies shortness of breath with exertion. Denies hemoptysis.   Cardiovascular: Denies chest pain at rest. Denies chest pain with exertion. Denies palpitations.    Gastrointestinal: Denies abdominal pain. Denies diarrhea. Denies nausea. Denies vomiting. Denies hematemesis or hematochezia. Denies heartburn.  Genitourinary: Denies blood in urine.  Musculoskeletal: See HPI for details  Integumentary: Denies rash. Denies photosensitivity.   Peripheral Vascular: Denies Ulcers of hands and/or feet. Denies Cold  "extremities.   Neurologic: Denies dizziness. Denies headache.  Denies loss of strength. Denies numbness or tingling.   Psychiatric: Denies depression. Denies anxiety. Denies suicidal/homicidal ideations.      Objective:     /70 (BP Location: Right arm, Patient Position: Sitting, BP Method: Large (Automatic))   Pulse 85   Temp 98.4 °F (36.9 °C) (Oral)   Resp 20   Ht 5' 7" (1.702 m)   Wt 84.2 kg (185 lb 9.6 oz)   LMP 2023   SpO2 99%   BMI 29.07 kg/m²     Physical Exam    General Appearance: alert, pleasant, in no acute distress.  Skin: Skin color, texture, turgor normal. No rashes or lesions.  Eyes:  extraocular movement intact (EOMI), pupils equal, round, reactive to light and accommodation, conjunctiva clear.  ENT: No oral or nasal ulcers.  Surgical scar on left side of neck.  Neck:  Neck supple. No adenopathy in neck or bilateral axilla.   Lungs: CTA throughout without crackles, rhonchi, or wheezes.   Heart: RRR w/o murmurs.  No edema. 2+ DP pulse.  Abdomen: Soft, non-tender, no masses, rebound or guarding.  Neuro: Alert, oriented, CN II-XII GI, sensory and motor innervation intact.  Musculoskeletal:  No synovitis on exam.  Good range of motion of bilateral shoulders.  No tenderness with range of motion of shoulders.  Psych: Alert, oriented, normal eye contact.      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  Increased  associated with karyorrhectic debris wrists and lack of neutrophilia supports the diagnosis.  Findings can be seen in Kikuchi-Leyda lymphadenopathy or with systemic lupus.  Rule out possibility of lupus.  No evidence of lymphoma or lymphoproliferative disorders.     Intermittent leukopenia and thrombocytopenia.  2018 WBC count 2.7, ANC 0.8.  :  Trace protein and moderate blood in urine.  2022:  No blood and protein in urine.  2023 WBC count 3.3, ANC 1.2.  CMP okay.    Imagin2023 chest x-ray showed " lungs are clear.    04/10/2023 ultrasound soft tissue head and neck showed reactive lymph nodes measuring up to 1.5 cm.    Assessment:       ICD-10-CM ICD-9-CM   1. 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 Neutropenia, unspecified type  D70.9 288.00   4. Shoulder stiffness, left  M25.612 719.51   5. Heart murmur  R01.1 785.2        Plan:     1. 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  No other episodes of lymphadenopathy.  No lymphadenopathy on exam today.  She is here for further evaluation to rule out underlying autoimmune diseases.  - order labs today.     2. Kikuchi disease: see above.     3. Neutropenia/leucopenia:  Intermittent leukopenia and thrombocytopenia.  07/11/2018 WBC count 2.7, ANC 0.8. 04/25/2023 WBC count 3.3, ANC 1.2.  Will monitor, check CBC today.     4. Shoulder stiffness, left:  Advised to do stretches and exercises as tolerated.  Advised to call me if shoulder stiffness or pain is getting worse, will do x-rays and send physical therapy referral.     5. Heart murmur:  She follows with Cardiology yearly.           Follow up in about 6 months (around 1/24/2024) for 6-8 months. In addition to their scheduled follow up, the patient has also been instructed to follow up on as needed basis.        Total time spent with patient and documentation is more than 60 minutes. All questions were answered to patient's satisfaction and patient verbalized understanding.

## 2023-08-16 ENCOUNTER — TELEPHONE (OUTPATIENT)
Dept: RHEUMATOLOGY | Facility: CLINIC | Age: 23
End: 2023-08-16
Payer: MEDICAID

## 2023-08-16 NOTE — TELEPHONE ENCOUNTER
----- Message from Cindy Montiel sent at 8/16/2023 12:31 PM CDT -----  Pt came up to window and wanted to know about lab results that were done 4 weeks ago. Pt can be reached @ [phone]. Please Advise

## 2023-08-18 NOTE — TELEPHONE ENCOUNTER
WBC counts are low but stable, will monitor. All other labs are normal, test for lupus negative. I will see her in jan 2024 and reevaluate.

## 2024-01-24 ENCOUNTER — OFFICE VISIT (OUTPATIENT)
Dept: RHEUMATOLOGY | Facility: CLINIC | Age: 24
End: 2024-01-24
Payer: MEDICAID

## 2024-01-24 VITALS
DIASTOLIC BLOOD PRESSURE: 70 MMHG | HEART RATE: 72 BPM | RESPIRATION RATE: 18 BRPM | BODY MASS INDEX: 31.18 KG/M2 | WEIGHT: 198.63 LBS | SYSTOLIC BLOOD PRESSURE: 103 MMHG | OXYGEN SATURATION: 100 % | TEMPERATURE: 99 F | HEIGHT: 67 IN

## 2024-01-24 DIAGNOSIS — Z23 FLU VACCINE NEED: ICD-10-CM

## 2024-01-24 DIAGNOSIS — M25.612 SHOULDER STIFFNESS, LEFT: ICD-10-CM

## 2024-01-24 DIAGNOSIS — D70.9 NEUTROPENIA, UNSPECIFIED TYPE: ICD-10-CM

## 2024-01-24 DIAGNOSIS — I88.1 KIKUCHI DISEASE: ICD-10-CM

## 2024-01-24 DIAGNOSIS — R01.1 HEART MURMUR: ICD-10-CM

## 2024-01-24 DIAGNOSIS — R59.0 LYMPHADENOPATHY, CERVICAL: Primary | ICD-10-CM

## 2024-01-24 PROBLEM — F32.A DEPRESSION: Status: ACTIVE | Noted: 2024-01-24

## 2024-01-24 PROCEDURE — 3078F DIAST BP <80 MM HG: CPT | Mod: CPTII,,, | Performed by: INTERNAL MEDICINE

## 2024-01-24 PROCEDURE — 99213 OFFICE O/P EST LOW 20 MIN: CPT | Mod: PBBFAC | Performed by: INTERNAL MEDICINE

## 2024-01-24 PROCEDURE — 3008F BODY MASS INDEX DOCD: CPT | Mod: CPTII,,, | Performed by: INTERNAL MEDICINE

## 2024-01-24 PROCEDURE — 99214 OFFICE O/P EST MOD 30 MIN: CPT | Mod: S$PBB,,, | Performed by: INTERNAL MEDICINE

## 2024-01-24 PROCEDURE — 3074F SYST BP LT 130 MM HG: CPT | Mod: CPTII,,, | Performed by: INTERNAL MEDICINE

## 2024-01-24 NOTE — PROGRESS NOTES
Patient ID: [id number]     Chief Complaint: Lymphadenopathy, cervical (Patient states she is doing well today. States she does often feel tingling feeling to right side of her head area where nodule was removed. )      HPI:     Joanna Prater is a 23 y.o. female here today for follow up after test results.     She had noticed swollen nodule on left side of neck in 3/2023, s/p resection in 4/2023, which showed inflammation, she is here for further evaluation to r/o underlying connective tissue disease. Denies any h/o infection before that. Denies h/o frequent infections.   No episodes like that in the past, currently does not have swollen glands.   No episodes of lymphadenopathy since last visit.   No fevers or rashes or joint pain. Denies red, warm and swollen joints. Morning stiffness in neck and shoulder sometimes for less than 20 min. Sometimes shoulder pain she mary jane hair, she stretches and it feels better.   She works in home health care.   C/o intermittent chest pain, lasts for few min in upper right chest. No worrisome findings now. Monitor and advised to get medical attention if its getting worse.     Denies history of fevers, rashes, photosensitivity, oral or nasal ulcers, h/o MI, stroke, seizures, h/o PE or DVT, Raynaud's phenomenon, uveitis, malignancies.   Family history of autoimmune disease: none.  Pregnancies:  none Miscarriages: none  Smoking: smokes Marijuana       Social History     Tobacco Use   Smoking Status Never    Passive exposure: Past   Smokeless Tobacco Never   Tobacco Comments    Pt denies smoking cigarettes.           ----------------------------  History of migraine  Mild intermittent asthma, uncomplicated     Past Surgical History:   Procedure Laterality Date    EXCISION OF NODULE Left 4/26/2023    Procedure: EXCISION, NODULE;  Surgeon: Fidencio Rosenberg MD;  Location: [facility];  Service: General;  Laterality: Left;  NECK    TONSILLECTOMY      WISDOM TOOTH EXTRACTION          Review of patient's allergies indicates:   Allergen Reactions    Adhesive Hives       Outpatient Medications Marked as Taking for the 1/24/24 encounter (Office Visit) with Allison Grande MD   Medication Sig Dispense Refill    norethindrone (MICRONOR) 0.35 mg tablet Take 1 tablet (0.35 mg total) by mouth once daily. 30 tablet 11       Social History     Socioeconomic History    Marital status: Single   Tobacco Use    Smoking status: Never     Passive exposure: Past    Smokeless tobacco: Never    Tobacco comments:     Pt denies smoking cigarettes.    Substance and Sexual Activity    Alcohol use: Yes     Comment: SOMETIMES    Drug use: Yes     Types: Marijuana    Sexual activity: Yes     Partners: Male        Family History   Problem Relation Age of Onset    Glaucoma Mother     Chronic back pain Father         Immunization History   Administered Date(s) Administered    DTP 02/10/2000, 04/11/2000, 06/14/2000, 05/10/2001, 01/08/2004    HIB 04/11/2000    HPV 9-Valent 05/31/2011, 08/09/2011, 06/14/2017, 06/14/2017    HPV Quadrivalent 05/31/2011, 08/09/2011    Hep B / HiB 02/10/2000, 05/10/2001    Hepatitis A, Pediatric/Adolescent, 2 Dose 06/14/2017, 12/14/2017    Hepatitis B 04/11/2000    Hepatitis B, Pediatric/Adolescent 1999    Influenza - Intranasal - Trivalent 09/14/2011, 11/19/2012    Influenza - Quadrivalent - PF *Preferred* (6 months and older) 12/14/2017    Influenza - Trivalent - PF (ADULT) 11/16/2005, 11/03/2010    Influenza A (H1N1) 2009 Monovalent - IM 12/09/2009    MMR 12/08/2000, 01/08/2004    Meningococcal B, OMV 12/14/2017    Meningococcal Conjugate (MCV4P) 05/31/2011, 06/14/2017    OPV 02/10/2000, 04/11/2000, 12/08/2000, 01/08/2004    Pneumococcal Conjugate - 7 Valent 05/10/2001, 07/10/2001    Tdap 05/31/2011    Varicella 12/08/2000, 05/31/2011       Patient Care Team:  Evon Christianson NP as PCP - General (Family Medicine)     Subjective:     Constitutional:  Denies chills. Denies fever.  "Denies night sweats. Denies weight loss.   Ophthalmology: Denies blurred vision. Denies dry eyes. Denies eye pain. Denies Itching and redness.   ENT: Denies oral ulcers. Denies epistaxis. Denies dry mouth. Denies swollen glands.   Endocrine: Denies diabetes. Denies thyroid Problems.   Respiratory: Denies cough. Denies shortness of breath. Denies shortness of breath with exertion. Denies hemoptysis.   Cardiovascular: Denies chest pain at rest. Denies chest pain with exertion. Denies palpitations.    Gastrointestinal: Denies abdominal pain. Denies diarrhea. Denies nausea. Denies vomiting. Denies hematemesis or hematochezia. Denies heartburn.  Genitourinary: Denies blood in urine.  Musculoskeletal: See HPI for details  Integumentary: Denies rash. Denies photosensitivity.   Peripheral Vascular: Denies Ulcers of hands and/or feet. Denies Cold extremities.   Neurologic: Denies dizziness. Denies headache.  Denies loss of strength. Denies numbness or tingling.   Psychiatric: Denies depression. Denies anxiety. Denies suicidal/homicidal ideations.      Objective:     /70 (BP Location: Left arm, Patient Position: Sitting, BP Method: Small (Automatic))   Pulse 72   Temp 98.6 °F (37 °C) (Oral)   Resp 18   Ht 5' 7" (1.702 m)   Wt 90.1 kg (198 lb 9.6 oz)   LMP 01/07/2024   SpO2 100%   BMI 31.11 kg/m²     Physical Exam    General Appearance: alert, pleasant, in no acute distress.  Skin: Skin color, texture, turgor normal. No rashes or lesions.  Eyes:  extraocular movement intact (EOMI), pupils equal, round, reactive to light and accommodation, conjunctiva clear.  ENT: No oral or nasal ulcers.  Surgical scar on left side of neck.  Neck:  Neck supple. No adenopathy in neck or bilateral axilla.   Lungs: CTA throughout without crackles, rhonchi, or wheezes.   Heart: RRR w/o murmurs.  No edema. 2+ DP pulse.  Abdomen: Soft, non-tender, no masses, rebound or guarding.  Neuro: Alert, oriented, CN II-XII GI, sensory and motor " innervation intact.  Musculoskeletal:  No synovitis on exam.  Good range of motion of bilateral shoulders.  No tenderness with range of motion of shoulders.  Psych: Alert, oriented, normal eye contact.      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  Increased  associated with karyorrhectic debris wrists and lack of neutrophilia supports the diagnosis.  Findings can be seen in Kikuchi-Leyda lymphadenopathy or with systemic lupus.  Rule out possibility of lupus.  No evidence of lymphoma or lymphoproliferative disorders.     Intermittent leukopenia and thrombocytopenia.  2018 WBC count 2.7, ANC 0.8.  :  Trace protein and moderate blood in urine.  2022:  No blood and protein in urine.  2023 WBC count 3.3, ANC 1.2.  CMP okay.  23: UPC ok, 100 mg/G. UA showed 1+ protein and no blood. CMP okay.  WBC count 4.34, ANC 2.08.  CBC okay otherwise.  C3, C4 okay.  ESR, CRP normal.  Negative Harvey and RNP. Negative SSA, SSB, TPO, centromere, Harvey, thyroglobulin, RNA andrea III. Negative dsDNA.  SMOOTH negative. B2 glycoprotein negative.  Lupus anticoagulant not detected.  Phospholipid/cardiolipin IgM, IgG, IgA negative.     Imagin2023 chest x-ray showed lungs are clear.    04/10/2023 ultrasound soft tissue head and neck showed reactive lymph nodes measuring up to 1.5 cm.    Assessment:       ICD-10-CM ICD-9-CM   1. 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 Neutropenia, unspecified type  D70.9 288.00   4. Shoulder stiffness, left  M25.612 719.51   5. Heart murmur  R01.1 785.2   6. Flu vaccine need  Z23 V04.81          Plan:     1. 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  No other episodes of lymphadenopathy.  No lymphadenopathy on exam today.  She is here for further evaluation to rule out  underlying autoimmune diseases.  - SMOOTH, JEWEL negative. Complements normal. UA and UPC ok. Currently does not have any evidence of lupus.   - No recurrence of lymphadenopathy. Advised to follow up with her PCP, discharged form our clinic. Can be referred again for any new issues in the future.       2. Kikuchi disease: see above.     3. Neutropenia/leucopenia:  Intermittent leukopenia and thrombocytopenia.  07/11/2018 WBC count 2.7, ANC 0.8. 04/25/2023 WBC count 3.3, ANC 1.2. 7/24/23:   WBC count 4.34, ANC 2.08. Platelets normal. ANC and WBC intermittently low, likely secondary to  ethnicity. No recurrent infections.      4. Shoulder stiffness, left:  Advised to do stretches and exercises as tolerated.  Shoulder not bothering her now.      5. Heart murmur:  She follows with Cardiology yearly.           Follow up if symptoms worsen or fail to improve. In addition to their scheduled follow up, the patient has also been instructed to follow up on as needed basis.        Total time spent with patient and documentation is 30 minutes. All questions were answered to patient's satisfaction and patient verbalized understanding.

## 2024-03-28 DIAGNOSIS — Z30.9 ENCOUNTER FOR CONTRACEPTIVE MANAGEMENT, UNSPECIFIED TYPE: Primary | ICD-10-CM

## 2024-03-28 RX ORDER — NORETHINDRONE 0.35 MG/1
1 TABLET ORAL DAILY
Qty: 30 TABLET | Refills: 11 | Status: SHIPPED | OUTPATIENT
Start: 2024-03-28

## 2024-05-09 DIAGNOSIS — R59.9 ENLARGED LYMPH NODES: Primary | ICD-10-CM

## 2024-05-14 ENCOUNTER — HOSPITAL ENCOUNTER (OUTPATIENT)
Dept: RADIOLOGY | Facility: HOSPITAL | Age: 24
Discharge: HOME | End: 2024-05-14
Attending: NURSE PRACTITIONER
Payer: MEDICAID

## 2024-05-14 DIAGNOSIS — R59.9 ENLARGED LYMPH NODES: ICD-10-CM

## 2024-05-14 PROCEDURE — 76536 US EXAM OF HEAD AND NECK: CPT | Mod: TC

## 2024-05-21 NOTE — PROGRESS NOTES
Chief Complaint: Annual exam    Chief Complaint   Patient presents with    Well Woman       HPI:   Joanna Prater is a 23 y.o. year old  here for her Annual Exam. Currently on Micronor for contraception. Content, desires to continue.     Gyn History:    Menstrual History   Cycle: Yes  Menarche Age: 1 years  Flow Duration: 6  Flow: Normal  Interval: 28  Intermenstrual Bleeding: No  Dysmenorrhea: No  [place]  Sexually Active: Yes  Sexual Orientation: heterosexual  Postcoital Bleeding: No  Dyspareunia: No  STI History: No  Contraception: Yes (Micronor)  Contraception Type: Oral contraceptives  Menopause  Menopause Age: 1 years  Breast History  Last Breast Imaging Date: No  History of Abnormal Breast Imaging : No  History of Breast Biopsy: No  Pap History   Last pap date: 23  Result: Normal  History of Abnormal Pap: (!) Yes (LGSIL )  HPV Vaccine Completed: Yes          Past Medical History:   Diagnosis Date    Heart murmur     History of migraine     Mild intermittent asthma, uncomplicated      Past Surgical History:   Procedure Laterality Date    EXCISION OF NODULE Left 2023    Procedure: EXCISION, NODULE;  Surgeon: Fidencio Rosenberg MD;  Location: [facility];  Service: General;  Laterality: Left;  NECK    TONSILLECTOMY      WISDOM TOOTH EXTRACTION         Current Outpatient Medications:     norethindrone (MICRONOR) 0.35 mg tablet, Take 1 tablet (0.35 mg total) by mouth once daily., Disp: 30 tablet, Rfl: 11  Review of patient's allergies indicates:   Allergen Reactions    Adhesive Hives     OB History    Para Term  AB Living   0 0 0 0 0 0   SAB IAB Ectopic Multiple Live Births   0 0 0 0 0     Social History     Tobacco Use    Smoking status: Never     Passive exposure: Past    Smokeless tobacco: Never    Tobacco comments:     Pt denies smoking cigarettes.    Substance Use Topics    Alcohol use: Yes     Alcohol/week: 2.0 standard drinks of alcohol     Types: 2 Drinks  "containing 0.5 oz of alcohol per week     Comment: I only drink occasionally.    Drug use: Yes     Frequency: 21.0 times per week     Types: Marijuana     Family History   Problem Relation Name Age of Onset    Glaucoma Mother      Chronic back pain Father      Cancer Maternal Grandmother Karon Prater     Diabetes Maternal Aunt Kelsie Prater     Stroke Maternal Aunt Skylar Prater        Review of Systems:   Review of Systems   Constitutional:  Negative for appetite change, chills, fatigue, fever and unexpected weight change.   Eyes:  Negative for visual disturbance.   Respiratory:  Negative for cough, shortness of breath and wheezing.    Cardiovascular:  Negative for chest pain, palpitations and leg swelling.   Gastrointestinal:  Negative for abdominal pain, bloating, blood in stool, constipation, diarrhea, nausea, vomiting, reflux and fecal incontinence.   Endocrine: Negative for hair loss and hot flashes.   Genitourinary:  Negative for bladder incontinence, decreased libido, dysmenorrhea, dyspareunia, dysuria, flank pain, frequency, genital sores, hematuria, hot flashes, menorrhagia, menstrual problem, pelvic pain, urgency, vaginal bleeding, vaginal discharge, vaginal pain, urinary incontinence, postcoital bleeding, postmenopausal bleeding, vaginal dryness and vaginal odor.   Integumentary:  Negative for rash, acne, hair changes, breast mass, nipple discharge, breast skin changes and breast tenderness.   Neurological:  Negative for headaches.   Psychiatric/Behavioral:  Negative for depression.    Breast: Negative for asymmetry, breast self exam, lump, mass, mastodynia, nipple discharge, skin changes and tenderness       Physical Exam:  /74   Ht 5' 7" (1.702 m)   Wt 91.6 kg (202 lb)   LMP 05/13/2024 (Exact Date)   BMI 31.64 kg/m²       Physical Exam:   Constitutional: She is oriented to person, place, and time. She appears well-developed and well-nourished.    HENT:   Head: Normocephalic.    "   Cardiovascular:       Exam reveals no edema.        Pulmonary/Chest: Effort normal. She exhibits no mass, no tenderness, no bony tenderness, no deformity and no retraction. Right breast exhibits no inverted nipple, no mass, no nipple discharge, no skin change, no tenderness, no bleeding, no swelling, no mastectomy, no augmentation and no lumpectomy. Left breast exhibits no inverted nipple, no mass, no nipple discharge, no skin change, no tenderness, no bleeding, no swelling, no mastectomy, no augmentation and no lumpectomy. Breasts are symmetrical.        Abdominal: Soft. She exhibits no distension and no mass. There is no abdominal tenderness. There is no rebound and no guarding. No hernia. Hernia confirmed negative in the right inguinal area.     Genitourinary:    Inguinal canal, vagina, uterus, right adnexa, left adnexa and rectum normal.   Rectum:      No anal fissure or external hemorrhoid.   The external female genitalia was normal.   No external genitalia lesions identified,Genitalia hair distrobution normal .     Labial bartholins normal.There is no rash, tenderness, lesion or injury on the right labia. There is no rash, tenderness, lesion or injury on the left labia. Cervix is normal. No no masses or organomegaly. Right adnexum displays no mass, no tenderness and no fullness. Left adnexum displays no mass, no tenderness and no fullness. Vagina exhibits no lesion. No erythema, vaginal discharge, tenderness, bleeding, rectocele, cystocele or prolapse of vaginal walls in the vagina.    No foreign body in the vagina.      No signs of injury in the vagina.   Vagina was moist.Cervix exhibits no motion tenderness, no lesion, no discharge, no friability, no tenderness and no polyp. Uterus consistancy normal and Uerus contour normal  Uterus is not deviated, not enlarged, not fixed, not tender, not hosting fibroids and no uterine prolapse. Normal urethral meatus.Urethral Meatus exhibits: urethral lesionUrethra  findings: no urethral mass, no tenderness and prolapsedBladder findings: no bladder tenderness          Musculoskeletal: Normal range of motion.      Lymphadenopathy: No inguinal adenopathy noted on the right or left side.    Neurological: She is alert and oriented to person, place, and time.    Skin: Skin is warm and dry.    Psychiatric: She has a normal mood and affect. Her behavior is normal. Judgment and thought content normal.        Assessment:   Annual Well Women Exam  1. Encounter for well woman exam with abnormal findings    2. BMI 31.0-31.9,adult        Plan:  Pap GC CZ TV collected   Breast Self-awareness  Recommend exercise at least 3 times weekly  Healthy, balanced diet  Keep yearly follow up with PCP    Desires to continue Micronor.  Call when time for refill.     RTC PRN/ANNUAL      Counseling:  A brief discussion of contraceptive choices and STD prevention was had.    Avoidance of cigarette smoking, alcohol use, and drug use was encouraged.    A healthy diet and regular exercise was stressed.    All questions were answered and the patient voiced understanding of the above issues.      This note was transcribed by Cass Leung. There may be transcription errors as a result, however minimal. Effort has been made to ensure accuracy of transcription, but any obvious errors or omissions should be clarified with the author of the document.

## 2024-05-29 ENCOUNTER — OFFICE VISIT (OUTPATIENT)
Dept: OBSTETRICS AND GYNECOLOGY | Facility: CLINIC | Age: 24
End: 2024-05-29
Payer: MEDICAID

## 2024-05-29 VITALS
BODY MASS INDEX: 31.71 KG/M2 | SYSTOLIC BLOOD PRESSURE: 130 MMHG | WEIGHT: 202 LBS | HEIGHT: 67 IN | DIASTOLIC BLOOD PRESSURE: 74 MMHG

## 2024-05-29 DIAGNOSIS — Z12.4 SCREENING FOR MALIGNANT NEOPLASM OF THE CERVIX: ICD-10-CM

## 2024-05-29 DIAGNOSIS — Z01.411 ENCOUNTER FOR WELL WOMAN EXAM WITH ABNORMAL FINDINGS: Primary | ICD-10-CM

## 2024-05-29 PROCEDURE — 3008F BODY MASS INDEX DOCD: CPT | Mod: CPTII,,, | Performed by: OBSTETRICS & GYNECOLOGY

## 2024-05-29 PROCEDURE — 87491 CHLMYD TRACH DNA AMP PROBE: CPT | Performed by: OBSTETRICS & GYNECOLOGY

## 2024-05-29 PROCEDURE — 3075F SYST BP GE 130 - 139MM HG: CPT | Mod: CPTII,,, | Performed by: OBSTETRICS & GYNECOLOGY

## 2024-05-29 PROCEDURE — 87661 TRICHOMONAS VAGINALIS AMPLIF: CPT | Performed by: OBSTETRICS & GYNECOLOGY

## 2024-05-29 PROCEDURE — 99395 PREV VISIT EST AGE 18-39: CPT | Mod: ,,, | Performed by: OBSTETRICS & GYNECOLOGY

## 2024-05-29 PROCEDURE — 3078F DIAST BP <80 MM HG: CPT | Mod: CPTII,,, | Performed by: OBSTETRICS & GYNECOLOGY

## 2024-05-29 PROCEDURE — 87591 N.GONORRHOEAE DNA AMP PROB: CPT | Performed by: OBSTETRICS & GYNECOLOGY

## 2024-05-29 PROCEDURE — 1159F MED LIST DOCD IN RCRD: CPT | Mod: CPTII,,, | Performed by: OBSTETRICS & GYNECOLOGY

## 2024-06-04 LAB
CHLAMYDIA TRACHOMATIS: NEGATIVE
NEISSERIA GONORRHOEAE: NEGATIVE
PSYCHE PATHOLOGY RESULT: NORMAL
TRICHOMONAS VAGINALIS: NEGATIVE

## 2025-02-28 DIAGNOSIS — Z30.9 ENCOUNTER FOR CONTRACEPTIVE MANAGEMENT, UNSPECIFIED TYPE: ICD-10-CM

## 2025-03-03 RX ORDER — NORETHINDRONE 0.35 MG/1
1 TABLET ORAL
Qty: 28 TABLET | Refills: 3 | Status: SHIPPED | OUTPATIENT
Start: 2025-03-03

## 2025-07-21 DIAGNOSIS — Z30.9 ENCOUNTER FOR CONTRACEPTIVE MANAGEMENT, UNSPECIFIED TYPE: ICD-10-CM

## 2025-07-21 RX ORDER — NORETHINDRONE 0.35 MG/1
1 TABLET ORAL DAILY
Qty: 28 TABLET | Refills: 0 | Status: SHIPPED | OUTPATIENT
Start: 2025-07-21

## 2025-08-15 ENCOUNTER — OFFICE VISIT (OUTPATIENT)
Dept: OBSTETRICS AND GYNECOLOGY | Facility: CLINIC | Age: 25
End: 2025-08-15

## 2025-08-15 VITALS
SYSTOLIC BLOOD PRESSURE: 126 MMHG | BODY MASS INDEX: 32.65 KG/M2 | WEIGHT: 208 LBS | HEIGHT: 67 IN | DIASTOLIC BLOOD PRESSURE: 84 MMHG

## 2025-08-15 DIAGNOSIS — N94.6 DYSMENORRHEA: ICD-10-CM

## 2025-08-15 DIAGNOSIS — Z30.41 ORAL CONTRACEPTIVE USE: ICD-10-CM

## 2025-08-15 DIAGNOSIS — Z11.3 SCREENING EXAMINATION FOR STD (SEXUALLY TRANSMITTED DISEASE): ICD-10-CM

## 2025-08-15 DIAGNOSIS — N92.0 MENORRHAGIA WITH REGULAR CYCLE: ICD-10-CM

## 2025-08-15 DIAGNOSIS — Z01.419 ROUTINE GYNECOLOGICAL EXAMINATION: Primary | ICD-10-CM

## 2025-08-15 DIAGNOSIS — Z12.4 CERVICAL CANCER SCREENING: ICD-10-CM

## 2025-08-15 RX ORDER — DROSPIRENONE 4 MG/1
4 TABLET, FILM COATED ORAL DAILY
Qty: 28 TABLET | Refills: 3 | Status: SHIPPED | OUTPATIENT
Start: 2025-08-15

## 2025-08-18 ENCOUNTER — TELEPHONE (OUTPATIENT)
Dept: OBSTETRICS AND GYNECOLOGY | Facility: CLINIC | Age: 25
End: 2025-08-18

## 2025-08-18 DIAGNOSIS — Z30.41 ORAL CONTRACEPTIVE USE: ICD-10-CM

## 2025-08-18 DIAGNOSIS — Z30.41 ORAL CONTRACEPTIVE USE: Primary | ICD-10-CM

## 2025-08-18 RX ORDER — NORETHINDRONE 0.35 MG/1
1 TABLET ORAL DAILY
Qty: 30 TABLET | Refills: 11 | Status: SHIPPED | OUTPATIENT
Start: 2025-08-18 | End: 2026-08-18

## 2025-08-18 RX ORDER — DROSPIRENONE 4 MG/1
4 TABLET, FILM COATED ORAL DAILY
Qty: 28 TABLET | Refills: 3 | Status: SHIPPED | OUTPATIENT
Start: 2025-08-18

## 2025-08-22 ENCOUNTER — TELEPHONE (OUTPATIENT)
Dept: OBSTETRICS AND GYNECOLOGY | Facility: CLINIC | Age: 25
End: 2025-08-22

## 2025-08-22 DIAGNOSIS — Z22.39 CARRIER OF UREAPLASMA UREALYTICUM: Primary | ICD-10-CM

## 2025-08-22 RX ORDER — DOXYCYCLINE 100 MG/1
100 CAPSULE ORAL 2 TIMES DAILY
Qty: 14 CAPSULE | Refills: 0 | Status: SHIPPED | OUTPATIENT
Start: 2025-08-22 | End: 2025-08-29

## (undated) DEVICE — Device

## (undated) DEVICE — SEE MEDLINE ITEM 157128

## (undated) DEVICE — SET SINGLE BASIN JENNINGS

## (undated) DEVICE — SUT GUT PL. 4-0 27 FS-2

## (undated) DEVICE — DRAPE ORTH SPLIT 77X108IN

## (undated) DEVICE — GLOVE 6.5 PROTEXIS PI BLUE

## (undated) DEVICE — BLADE SURG #15 CARBON STEEL

## (undated) DEVICE — SYR 10CC LUER LOCK

## (undated) DEVICE — NDL SAFETY STD LUER 22GX1.5IN

## (undated) DEVICE — SUT 3-0 VICRYL SH CR/8 18

## (undated) DEVICE — GOWN SMARTGOWN RAGLAN BLUE XL

## (undated) DEVICE — GLOVE 7.5 PROTEXIS PI BLUE

## (undated) DEVICE — GLOVE PROTEXIS PI SYN SURG 7

## (undated) DEVICE — GLOVE PROTEXIS PI SYN SURG 6.5